# Patient Record
(demographics unavailable — no encounter records)

---

## 2025-07-09 NOTE — PLAN
[FreeTextEntry1] : US abdomen ordered. f/u pending results. Pt advised to sign up for [facility] portal to review labs and communicate any questions or concerns directly. Yearly physical and return as needed for illness, medication refills, and new or existing complaints.

## 2025-07-09 NOTE — REVIEW OF SYSTEMS
He would like to speak to provider directly if possible about his left shoulder pain, he is looking for a second opinion, he is home today so please call him at the home number.   [FreeTextEntry1] : See assessment

## 2025-07-09 NOTE — PHYSICAL EXAM
[No Acute Distress] : no acute distress [Well Nourished] : well nourished [Well Developed] : well developed [Well-Appearing] : well-appearing [No Respiratory Distress] : no respiratory distress  [No Accessory Muscle Use] : no accessory muscle use [Soft] : abdomen soft [Non-distended] : non-distended [No Masses] : no abdominal mass palpated [Normal Bowel Sounds] : normal bowel sounds [Coordination Grossly Intact] : coordination grossly intact [No Focal Deficits] : no focal deficits [Normal Gait] : normal gait [Normal Affect] : the affect was normal [Normal Insight/Judgement] : insight and judgment were intact [de-identified] : RUQ tenderness, no rebound